# Patient Record
Sex: FEMALE | Race: WHITE | HISPANIC OR LATINO | URBAN - METROPOLITAN AREA
[De-identification: names, ages, dates, MRNs, and addresses within clinical notes are randomized per-mention and may not be internally consistent; named-entity substitution may affect disease eponyms.]

---

## 2017-03-21 ENCOUNTER — EMERGENCY (EMERGENCY)
Facility: HOSPITAL | Age: 62
LOS: 1 days | Discharge: HOME | End: 2017-03-21
Attending: EMERGENCY MEDICINE
Payer: MEDICAID

## 2017-03-21 VITALS
SYSTOLIC BLOOD PRESSURE: 137 MMHG | DIASTOLIC BLOOD PRESSURE: 84 MMHG | RESPIRATION RATE: 18 BRPM | HEART RATE: 76 BPM | TEMPERATURE: 98 F | OXYGEN SATURATION: 99 %

## 2017-03-21 VITALS — WEIGHT: 186.95 LBS

## 2017-03-21 DIAGNOSIS — E11.9 TYPE 2 DIABETES MELLITUS WITHOUT COMPLICATIONS: ICD-10-CM

## 2017-03-21 DIAGNOSIS — F41.9 ANXIETY DISORDER, UNSPECIFIED: ICD-10-CM

## 2017-03-21 DIAGNOSIS — R51 HEADACHE: ICD-10-CM

## 2017-03-21 DIAGNOSIS — E78.00 PURE HYPERCHOLESTEROLEMIA, UNSPECIFIED: ICD-10-CM

## 2017-03-21 PROCEDURE — 36000 PLACE NEEDLE IN VEIN: CPT

## 2017-03-21 PROCEDURE — 70450 CT HEAD/BRAIN W/O DYE: CPT

## 2017-03-21 PROCEDURE — 99284 EMERGENCY DEPT VISIT MOD MDM: CPT

## 2017-03-21 PROCEDURE — 93005 ELECTROCARDIOGRAM TRACING: CPT

## 2017-03-21 PROCEDURE — 85027 COMPLETE CBC AUTOMATED: CPT

## 2017-03-21 PROCEDURE — 36416 COLLJ CAPILLARY BLOOD SPEC: CPT

## 2017-03-21 PROCEDURE — 80053 COMPREHEN METABOLIC PANEL: CPT

## 2017-03-21 PROCEDURE — 70450 CT HEAD/BRAIN W/O DYE: CPT | Mod: 26

## 2017-03-21 RX ORDER — SODIUM CHLORIDE 9 MG/ML
1000 INJECTION INTRAMUSCULAR; INTRAVENOUS; SUBCUTANEOUS ONCE
Qty: 0 | Refills: 0 | Status: COMPLETED | OUTPATIENT
Start: 2017-03-21 | End: 2017-03-21

## 2017-03-21 RX ORDER — ALPRAZOLAM 0.25 MG
0.5 TABLET ORAL ONCE
Qty: 0 | Refills: 0 | Status: DISCONTINUED | OUTPATIENT
Start: 2017-03-21 | End: 2017-03-21

## 2017-03-21 RX ORDER — IBUPROFEN 200 MG
1 TABLET ORAL
Qty: 20 | Refills: 0 | OUTPATIENT
Start: 2017-03-21 | End: 2017-03-26

## 2017-03-21 RX ADMIN — SODIUM CHLORIDE 1000 MILLILITER(S): 9 INJECTION INTRAMUSCULAR; INTRAVENOUS; SUBCUTANEOUS at 10:35

## 2017-03-21 RX ADMIN — Medication 0.5 MILLIGRAM(S): at 10:40

## 2017-03-21 NOTE — ED PROVIDER NOTE - PROGRESS NOTE DETAILS
pt feeling much better, family at bedside, notes pt has been increasingly anxious recently. Will f/u with her primary care doctor this week.

## 2017-03-21 NOTE — ED PROVIDER NOTE - MEDICAL DECISION MAKING DETAILS
Pt with likely anxiety, but given acute onset of HA with vomiting will r/o ICH. Will get CT head, basic labs, Xanax, then reassess. Pt with likely anxiety, but given acute onset of HA with vomiting will r/o ICH. Will get CT head, basic labs, Xanax, then reassess. Pt with headache onset, less than 1 hour PTA. Low probability of SAH, however CT will r/o ICH where the risks of a LP outweight benefits.

## 2017-03-21 NOTE — ED PROVIDER NOTE - NS ED MD SCRIBE ATTENDING SCRIBE SECTIONS
PAST MEDICAL/SURGICAL/SOCIAL HISTORY/HIV/REVIEW OF SYSTEMS/PHYSICAL EXAM/RESULTS/HISTORY OF PRESENT ILLNESS

## 2017-03-21 NOTE — ED PROVIDER NOTE - OBJECTIVE STATEMENT
60 y/o female with PMHx of IDDM, HTN, HLD, and Anxiety presents to the ED for palpitations today. Pt reports that she was taking her Anxiety medication (trazodone?) when she suddenly started to feel dizzy and developed a HA and vomited x 1 episode. Pt then notes that she started to get palpitations and tingling sensation to her fingertips. Pt denies CP, leg swelling, recent sick contacts, or any other complaints. NKDA. 60 y/o female with PMHx of IDDM, HTN, HLD, and Anxiety presents to the ED for palpitations today. Pt reports that she was taking her Anxiety medication (trazodone?) when she suddenly started to feel dizzy and developed a HA and vomited x 1 episode. Pt then notes that she started to get palpitations and tingling sensation to her fingertips. Pt denies CP, leg swelling, recent sick contacts, or any other complaints. NKDA. She now notes that she feels very nervous.
